# Patient Record
Sex: MALE | Race: WHITE | NOT HISPANIC OR LATINO | Employment: OTHER | URBAN - METROPOLITAN AREA
[De-identification: names, ages, dates, MRNs, and addresses within clinical notes are randomized per-mention and may not be internally consistent; named-entity substitution may affect disease eponyms.]

---

## 2019-09-24 ENCOUNTER — OFFICE VISIT (OUTPATIENT)
Dept: GASTROENTEROLOGY | Facility: CLINIC | Age: 84
End: 2019-09-24

## 2019-09-24 VITALS
TEMPERATURE: 98.1 F | SYSTOLIC BLOOD PRESSURE: 146 MMHG | WEIGHT: 193.2 LBS | BODY MASS INDEX: 27.66 KG/M2 | DIASTOLIC BLOOD PRESSURE: 74 MMHG | HEIGHT: 70 IN

## 2019-09-24 DIAGNOSIS — K62.5 RECTAL BLEEDING: ICD-10-CM

## 2019-09-24 DIAGNOSIS — R68.81 EARLY SATIETY: ICD-10-CM

## 2019-09-24 DIAGNOSIS — R19.5 CHANGE IN STOOL CALIBER: Primary | ICD-10-CM

## 2019-09-24 DIAGNOSIS — K21.9 GASTROESOPHAGEAL REFLUX DISEASE, ESOPHAGITIS PRESENCE NOT SPECIFIED: ICD-10-CM

## 2019-09-24 PROCEDURE — 99204 OFFICE O/P NEW MOD 45 MIN: CPT | Performed by: INTERNAL MEDICINE

## 2019-09-24 RX ORDER — OMEPRAZOLE 20 MG/1
20 CAPSULE, DELAYED RELEASE ORAL DAILY
COMMUNITY

## 2019-09-24 RX ORDER — SODIUM CHLORIDE, SODIUM LACTATE, POTASSIUM CHLORIDE, CALCIUM CHLORIDE 600; 310; 30; 20 MG/100ML; MG/100ML; MG/100ML; MG/100ML
30 INJECTION, SOLUTION INTRAVENOUS CONTINUOUS
Status: CANCELLED | OUTPATIENT
Start: 2019-11-12

## 2019-09-24 NOTE — PROGRESS NOTES
Chief Complaint   Patient presents with   • change in bowel habits   • early satiety   • Heartburn       Subjective     HPI    Pavan Jones is a 84 y.o. male with a past medical history noted below who presents for evaluation of GERD, change in bowel habits, change in stool caliber, early satiety.  Symptoms present for about 3 months.  He has gone from regular BMs to stools that are more narrow and feeling more incomplete evacuation.  Feels that he is staying full and uncomfortable in between BMs.  Feels bloated in his abdomen.  Having some bright red blood with harder stools.  He thinks this is related to hemorrhoids.  He denies that he is ever been constipated before.    Also feels food sitting in his stomach.  Feels full longer.  Feels bloated.      He has not had any recent colonoscopy.  He thinks he has had polyps in the past.    He does have GERD controlled with omeprazole    He stays active, works out regularly, eats healthily.  He is only on a PPI as a medication.  He denies any heart disease, no lung disease.    Mother  in her 90s.  No known GI malignancies in his family.    No smoking, no EtOH    He has had an appendectomy, he has had inguinal hernia repair.    [facility]  He reports normal recent labs.        Past Medical History:   Diagnosis Date   • GERD (gastroesophageal reflux disease)    • Sleep apnea          Current Outpatient Medications:   •  omeprazole (priLOSEC) 20 MG capsule, Take 20 mg by mouth Daily., Disp: , Rfl:     No Known Allergies    Social History     Socioeconomic History   • Marital status:      Spouse name: Not on file   • Number of children: Not on file   • Years of education: Not on file   • Highest education level: Not on file   Tobacco Use   • Smoking status: Never Smoker   • Smokeless tobacco: Never Used       History reviewed. No pertinent family history.    Review of Systems   Constitutional: Negative for activity  change, appetite change and fatigue.   HENT: Negative for sore throat and trouble swallowing.    Respiratory: Negative.    Cardiovascular: Negative.    Gastrointestinal: Positive for abdominal distention, blood in stool and constipation. Negative for abdominal pain.        Early satiety   Endocrine: Negative for cold intolerance and heat intolerance.   Genitourinary: Negative for difficulty urinating, dysuria and frequency.   Musculoskeletal: Negative for arthralgias, back pain and myalgias.   Skin: Negative.    Hematological: Negative for adenopathy. Does not bruise/bleed easily.   All other systems reviewed and are negative.      Objective     Vitals:    09/24/19 1543   BP: 146/74   Temp: 98.1 °F (36.7 °C)         09/24/19  1543   Weight: 87.6 kg (193 lb 3.2 oz)     Body mass index is 27.72 kg/m².    Physical Exam   Constitutional: He is oriented to person, place, and time. He appears well-developed and well-nourished. No distress.   Appears younger than stated age   HENT:   Head: Normocephalic and atraumatic.   Right Ear: External ear normal.   Left Ear: External ear normal.   Nose: Nose normal.   Mouth/Throat: Oropharynx is clear and moist.   Eyes: Conjunctivae and EOM are normal. Right eye exhibits no discharge. Left eye exhibits no discharge. No scleral icterus.   Neck: Normal range of motion. Neck supple. No thyromegaly present.   No supraclavicular adenopathy   Cardiovascular: Normal rate, regular rhythm, normal heart sounds and intact distal pulses. Exam reveals no gallop.   No murmur heard.  No lower extremity edema   Pulmonary/Chest: Effort normal and breath sounds normal. No respiratory distress. He has no wheezes.   Abdominal: Soft. Normal appearance and bowel sounds are normal. He exhibits no distension and no mass. There is no hepatosplenomegaly. There is no tenderness. There is no rigidity, no rebound and no guarding. No hernia.   Genitourinary:   Genitourinary Comments: Rectal exam deferred    Musculoskeletal: Normal range of motion. He exhibits no edema or tenderness.   No atrophy of upper or lower extremities.  Normal digits and nails of both hands.   Lymphadenopathy:     He has no cervical adenopathy.   Neurological: He is alert and oriented to person, place, and time. He displays no atrophy. Coordination normal.   Skin: Skin is warm and dry. No rash noted. He is not diaphoretic. No erythema.   Psychiatric: He has a normal mood and affect. His behavior is normal. Judgment and thought content normal.   Vitals reviewed.      No results found for: WBC, RBC, HGB, HCT, MCV, MCH, MCHC, RDW, RDWSD, MPV, PLT, NEUTRORELPCT, LYMPHORELPCT, MONORELPCT, EOSRELPCT, BASORELPCT, AUTOIGPER, NEUTROABS, LYMPHSABS, MONOSABS, EOSABS, BASOSABS, AUTOIGNUM, NRBC    No results found for: GLUCOSE, NA, K, CO2, CL, ANIONGAP, CREATININE, BUN, BCR, CALCIUM, EGFRIFNONA, ALKPHOS, PROTEINTOT, ALT, AST, BILITOT, ALBUMIN, GLOB, LABIL2      Imaging Results (last 7 days)     ** No results found for the last 168 hours. **            No notes on file    Assessment/Plan    Change in stool caliber: Recent event over the past few months    Rectal bleeding: Suspected anal outlet issue    Early satiety: No recent EGD    Gastroesophageal reflux disease: Controlled on daily PPI    Plan  This is a very healthy 84-year-old man, only on a PPI.  He has no history of heart disease, lung disease, stroke.  He is having a change in bowel habits, early satiety, has a history of GERD.  I do think it is reasonable in this setting to proceed with EGD and colonoscopy for further evaluation.  We discussed the procedures, the risk of sedation, the bowel prep.  He verbalizes understanding and wishes to proceed.    Continue omeprazole    Further recommendations after his endoscopy    Pavan was seen today for change in bowel habits, early satiety and heartburn.    Diagnoses and all orders for this visit:    Change in stool caliber  -     Case Request;  Standing  -     Follow Anesthesia Guidelines / Standing Orders; Future  -     Obtain Informed Consent; Future  -     Implement Anesthesia Orders Day of Procedure; Standing  -     Obtain Informed Consent; Standing  -     lactated ringers infusion  -     Case Request    Rectal bleeding  -     Case Request; Standing  -     Follow Anesthesia Guidelines / Standing Orders; Future  -     Obtain Informed Consent; Future  -     Implement Anesthesia Orders Day of Procedure; Standing  -     Obtain Informed Consent; Standing  -     lactated ringers infusion  -     Case Request    Early satiety  -     Case Request; Standing  -     Follow Anesthesia Guidelines / Standing Orders; Future  -     Obtain Informed Consent; Future  -     Implement Anesthesia Orders Day of Procedure; Standing  -     Obtain Informed Consent; Standing  -     lactated ringers infusion  -     Case Request    Gastroesophageal reflux disease, esophagitis presence not specified  -     Case Request; Standing  -     Follow Anesthesia Guidelines / Standing Orders; Future  -     Obtain Informed Consent; Future  -     Implement Anesthesia Orders Day of Procedure; Standing  -     Obtain Informed Consent; Standing  -     lactated ringers infusion  -     Case Request        I have discussed the above plan with the patient.  They verbalize understanding and are in agreement with the plan.  They have been advised to contact the office for any questions, concerns, or changes related to their health.    Dictated utilizing Dragon dictation

## 2019-09-24 NOTE — PATIENT INSTRUCTIONS
Schedule the scope tests    Continue healthy habits    For any additional questions, concerns or changes to your condition after today's office visit please contact the office at [phone].

## 2019-11-12 ENCOUNTER — ANESTHESIA EVENT (OUTPATIENT)
Dept: GASTROENTEROLOGY | Facility: HOSPITAL | Age: 84
End: 2019-11-12

## 2019-11-12 ENCOUNTER — ANESTHESIA (OUTPATIENT)
Dept: GASTROENTEROLOGY | Facility: HOSPITAL | Age: 84
End: 2019-11-12

## 2019-11-12 ENCOUNTER — HOSPITAL ENCOUNTER (OUTPATIENT)
Facility: HOSPITAL | Age: 84
Setting detail: HOSPITAL OUTPATIENT SURGERY
Discharge: HOME | End: 2019-11-12
Attending: INTERNAL MEDICINE | Admitting: INTERNAL MEDICINE

## 2019-11-12 VITALS
DIASTOLIC BLOOD PRESSURE: 69 MMHG | SYSTOLIC BLOOD PRESSURE: 124 MMHG | BODY MASS INDEX: 26.22 KG/M2 | TEMPERATURE: 97.9 F | OXYGEN SATURATION: 96 % | HEIGHT: 71 IN | HEART RATE: 71 BPM | RESPIRATION RATE: 16 BRPM | WEIGHT: 187.25 LBS

## 2019-11-12 DIAGNOSIS — R68.81 EARLY SATIETY: ICD-10-CM

## 2019-11-12 DIAGNOSIS — K21.9 GASTROESOPHAGEAL REFLUX DISEASE, ESOPHAGITIS PRESENCE NOT SPECIFIED: ICD-10-CM

## 2019-11-12 DIAGNOSIS — R19.5 CHANGE IN STOOL CALIBER: ICD-10-CM

## 2019-11-12 DIAGNOSIS — K62.5 RECTAL BLEEDING: ICD-10-CM

## 2019-11-12 PROCEDURE — 88313 SPECIAL STAINS GROUP 2: CPT | Performed by: INTERNAL MEDICINE

## 2019-11-12 PROCEDURE — 25010000002 PROPOFOL 10 MG/ML EMULSION: Performed by: ANESTHESIOLOGY

## 2019-11-12 PROCEDURE — 43239 EGD BIOPSY SINGLE/MULTIPLE: CPT | Performed by: INTERNAL MEDICINE

## 2019-11-12 PROCEDURE — S0260 H&P FOR SURGERY: HCPCS | Performed by: INTERNAL MEDICINE

## 2019-11-12 PROCEDURE — 88305 TISSUE EXAM BY PATHOLOGIST: CPT | Performed by: INTERNAL MEDICINE

## 2019-11-12 PROCEDURE — 45380 COLONOSCOPY AND BIOPSY: CPT | Performed by: INTERNAL MEDICINE

## 2019-11-12 RX ORDER — SODIUM CHLORIDE, SODIUM LACTATE, POTASSIUM CHLORIDE, CALCIUM CHLORIDE 600; 310; 30; 20 MG/100ML; MG/100ML; MG/100ML; MG/100ML
30 INJECTION, SOLUTION INTRAVENOUS CONTINUOUS
Status: DISCONTINUED | OUTPATIENT
Start: 2019-11-12 | End: 2019-11-12 | Stop reason: HOSPADM

## 2019-11-12 RX ORDER — PROPOFOL 10 MG/ML
VIAL (ML) INTRAVENOUS CONTINUOUS PRN
Status: DISCONTINUED | OUTPATIENT
Start: 2019-11-12 | End: 2019-11-12 | Stop reason: SURG

## 2019-11-12 RX ORDER — SODIUM CHLORIDE 0.9 % (FLUSH) 0.9 %
3 SYRINGE (ML) INJECTION EVERY 12 HOURS SCHEDULED
Status: DISCONTINUED | OUTPATIENT
Start: 2019-11-12 | End: 2019-11-12 | Stop reason: HOSPADM

## 2019-11-12 RX ORDER — SODIUM CHLORIDE 0.9 % (FLUSH) 0.9 %
10 SYRINGE (ML) INJECTION AS NEEDED
Status: DISCONTINUED | OUTPATIENT
Start: 2019-11-12 | End: 2019-11-12 | Stop reason: HOSPADM

## 2019-11-12 RX ORDER — GLYCOPYRROLATE 0.2 MG/ML
INJECTION INTRAMUSCULAR; INTRAVENOUS AS NEEDED
Status: DISCONTINUED | OUTPATIENT
Start: 2019-11-12 | End: 2019-11-12 | Stop reason: SURG

## 2019-11-12 RX ORDER — LIDOCAINE HYDROCHLORIDE 20 MG/ML
INJECTION, SOLUTION INFILTRATION; PERINEURAL AS NEEDED
Status: DISCONTINUED | OUTPATIENT
Start: 2019-11-12 | End: 2019-11-12 | Stop reason: SURG

## 2019-11-12 RX ADMIN — SODIUM CHLORIDE, POTASSIUM CHLORIDE, SODIUM LACTATE AND CALCIUM CHLORIDE 30 ML/HR: 600; 310; 30; 20 INJECTION, SOLUTION INTRAVENOUS at 09:45

## 2019-11-12 RX ADMIN — PROPOFOL 140 MCG/KG/MIN: 10 INJECTION, EMULSION INTRAVENOUS at 10:00

## 2019-11-12 RX ADMIN — LIDOCAINE HYDROCHLORIDE 4 MG: 20 INJECTION, SOLUTION INFILTRATION; PERINEURAL at 10:00

## 2019-11-12 RX ADMIN — GLYCOPYRROLATE 0.2 MCG: 0.2 INJECTION INTRAMUSCULAR; INTRAVENOUS at 10:00

## 2019-11-12 RX ADMIN — ALFENTANIL HYDROCHLORIDE 250 MCG: 500 INJECTION, SOLUTION INTRAVENOUS at 10:15

## 2019-11-12 RX ADMIN — ALFENTANIL HYDROCHLORIDE 250 MCG: 500 INJECTION, SOLUTION INTRAVENOUS at 10:02

## 2019-11-12 NOTE — H&P
[facility]  Pre Procedure History & Physical    Chief Complaint: GERD, early satiety, change in bowel habits, change in stool caliber      HPI: 84 y.o. male with a past medical history noted below who presents for evaluation of GERD, change in bowel habits, change in stool caliber, early satiety.  Symptoms present for about 3 months.  He has gone from regular BMs to stools that are more narrow and feeling more incomplete evacuation.  Feels that he is staying full and uncomfortable in between BMs.  Feels bloated in his abdomen.  Having some bright red blood with harder stools.  He thinks this is related to hemorrhoids.  He denies that he is ever been constipated before.     Also feels food sitting in his stomach.  Feels full longer.  Feels bloated.       He has not had any recent colonoscopy.  He thinks he has had polyps in the past.     He does have GERD controlled with omeprazole     He stays active, works out regularly, eats healthily.  He is only on a PPI as a medication.  He denies any heart disease, no lung disease.     Mother  in her 90s.  No known GI malignancies in his family.     No smoking, no EtOH     He has had an appendectomy, he has had inguinal hernia repair.     [facility].  He reports normal recent labs.    Past Medical History:   Past Medical History:   Diagnosis Date   • GERD (gastroesophageal reflux disease)    • Sleep apnea        Family History:  No family history on file.    Social History:   reports that he has never smoked. He has never used smokeless tobacco.    Medications:   No medications prior to admission.       Allergies:  Patient has no known allergies.    ROS:    Pertinent items are noted in HPI     Objective     There were no vitals taken for this visit.    Physical Exam   Constitutional: Pt is oriented to person, place, and time and well-developed, well-nourished, and in no distress.   HENT:   Mouth/Throat:  Oropharynx is clear and moist.   Neck: Normal range of motion. Neck supple.   Cardiovascular: Normal rate, regular rhythm and normal heart sounds.    Pulmonary/Chest: Effort normal and breath sounds normal. No respiratory distress. No  wheezes.   Abdominal: Soft. Bowel sounds are normal.   Skin: Skin is warm and dry.   Psychiatric: Mood, memory, affect and judgment normal.     Assessment/Plan     Diagnosis: GERD, early satiety, change in bowel habits, change in stool caliber      Anticipated Surgical Procedure:  EGD  Colonoscopy    The risks, benefits, and alternatives of this procedure have been discussed with the patient or the responsible party- the patient understands and agrees to proceed.

## 2019-11-12 NOTE — ANESTHESIA PREPROCEDURE EVALUATION
Anesthesia Evaluation     Patient summary reviewed and Nursing notes reviewed   no history of anesthetic complications:  NPO Solid Status: > 6 hours  NPO Liquid Status: > 6 hours           Airway   Mallampati: II  TM distance: >3 FB  Neck ROM: full  no difficulty expected and No difficulty expected  Dental - normal exam     Pulmonary - normal exam    breath sounds clear to auscultation  (+) sleep apnea,   (-) rhonchi, decreased breath sounds, wheezes, rales, stridor  Cardiovascular - negative cardio ROS and normal exam    NYHA Classification: I  Rhythm: regular  Rate: normal    (-) murmur, weak pulses, friction rub, systolic click, carotid bruits, JVD, peripheral edema      Neuro/Psych- negative ROS  GI/Hepatic/Renal/Endo    (+)  GERD, GI bleeding lower active bleeding,     Musculoskeletal (-) negative ROS    Abdominal  - normal exam    Abdomen: soft.   Substance History - negative use     OB/GYN negative ob/gyn ROS         Other - negative ROS                       Anesthesia Plan    ASA 2     MAC     intravenous induction     Anesthetic plan, all risks, benefits, and alternatives have been provided, discussed and informed consent has been obtained with: patient.

## 2019-11-12 NOTE — ANESTHESIA POSTPROCEDURE EVALUATION
"Patient: Pavan Jnoes    Procedure Summary     Date:  11/12/19 Room / Location:  [facility] ENDOSCOPY 8 /  [facility]    Anesthesia Start:  0955 Anesthesia Stop:  1034    Procedures:       COLONOSCOPY into cecum and TI with biopsies and cold bx polypectomies  (N/A )      ESOPHAGOGASTRODUODENOSCOPY with biopsies (N/A Esophagus) Diagnosis:       Change in stool caliber      Rectal bleeding      Early satiety      Gastroesophageal reflux disease, esophagitis presence not specified      (Change in stool caliber [R19.4])      (Rectal bleeding [K62.5])      (Early satiety [R68.81])      (Gastroesophageal reflux disease, esophagitis presence not specified [K21.9])    Surgeon:  Norah Contreras MD Provider:  Austen Walters MD    Anesthesia Type:  MAC ASA Status:  2          Anesthesia Type: MAC  Last vitals  BP   124/69 (11/12/19 1055)   Temp   36.6 °C (97.9 °F) (11/12/19 0923)   Pulse   71 (11/12/19 1055)   Resp   16 (11/12/19 1055)     SpO2   96 % (11/12/19 1055)     Post Anesthesia Care and Evaluation    Patient location during evaluation: bedside  Patient participation: complete - patient participated  Level of consciousness: awake and alert  Pain management: adequate  Airway patency: patent  Anesthetic complications: No anesthetic complications    Cardiovascular status: acceptable  Respiratory status: acceptable  Hydration status: acceptable    Comments: /69 (BP Location: Left arm, Patient Position: Sitting)   Pulse 71   Temp 36.6 °C (97.9 °F) (Oral)   Resp 16   Ht 180.3 cm (71\")   Wt 84.9 kg (187 lb 4 oz)   SpO2 96%   BMI 26.12 kg/m²           "

## 2019-11-15 LAB
CYTO UR: NORMAL
LAB AP CASE REPORT: NORMAL
LAB AP DIAGNOSIS COMMENT: NORMAL
PATH REPORT.ADDENDUM SPEC: NORMAL
PATH REPORT.FINAL DX SPEC: NORMAL
PATH REPORT.GROSS SPEC: NORMAL

## 2019-11-19 NOTE — PROGRESS NOTES
The colon polyps were benign hyperplastic polyps.    The random colon biopsies show normal healthy colon tissue.    He does have some mild gastritis and mild esophagitis but no evidence of Sharma's esophagus nor H. pylori infection    Continue omeprazole    No recall needed    He is welcome to follow-up with me as needed

## 2019-11-21 ENCOUNTER — TELEPHONE (OUTPATIENT)
Dept: GASTROENTEROLOGY | Facility: CLINIC | Age: 84
End: 2019-11-21

## 2019-11-21 NOTE — TELEPHONE ENCOUNTER
----- Message from Shila Norris sent at 11/21/2019  2:07 PM EST -----  Regarding: C/S  Contact: [phone]  CALLING FOR C/S REPORT

## 2019-11-21 NOTE — TELEPHONE ENCOUNTER
"----- Message from Nroah Contreras MD sent at 11/19/2019  6:04 PM EST -----  The colon polyps were benign hyperplastic polyps.    The random colon biopsies show normal healthy colon tissue.    He does have some mild gastritis and mild esophagitis but no evidence of Sharma's esophagus nor H. pylori infection    Continue omeprazole    No recall needed    He is welcome to follow-up with me as needed      __Called pt and advised per Dr Contreras of the above.  Pt verb understanding.  Pt is asking if he can donate blood.  Advised pt will send message to Dr Contreras. Pt verb understanding and states he thinks it would be to donate blood and \"get rid of some of the stale blood and replace it with new rejuevenated blood\".    -Alexus Magana RN  "

## 2019-11-21 NOTE — TELEPHONE ENCOUNTER
I do not think donating blood will have the intended effect that he wants.  He is welcome to do so if he wishes, there is no contraindication from my standpoint.

## 2019-11-26 NOTE — TELEPHONE ENCOUNTER
His digital rectal exam was normal with no enlarged prostate palpated.  However, if he is concerned about this he should follow-up with his primary care physician.

## 2019-11-26 NOTE — TELEPHONE ENCOUNTER
Called pt and advised per Dr Contreras that she does not think donating blood will have the intended effect that he wants. He is welcome to do so if he wishes, there is no contraindication from her standpoint.     Pt verb understanding and reports that Dr Contreras was going to check his prostate and he is asking about this. ADvised will send message to Dr Contreras.

## 2019-11-26 NOTE — TELEPHONE ENCOUNTER
Called pt and advised per Dr Contreras that his digital exam was normal with no enlarged prostate palpated.  However , if he is concerned about this he should f/u with his pcp. Pt verb understanding.

## (undated) DEVICE — BITEBLOCK OMNI BLOC

## (undated) DEVICE — SENSR O2 OXIMAX FNGR A/ 18IN NONSTR

## (undated) DEVICE — CANN NASL CO2 TRULINK W/O2 A/

## (undated) DEVICE — THE TORRENT IRRIGATION SCOPE CONNECTOR IS USED WITH THE TORRENT IRRIGATION TUBING TO PROVIDE IRRIGATION FLUIDS SUCH AS STERILE WATER DURING GASTROINTESTINAL ENDOSCOPIC PROCEDURES WHEN USED IN CONJUNCTION WITH AN IRRIGATION PUMP (OR ELECTROSURGICAL UNIT).: Brand: TORRENT

## (undated) DEVICE — TUBING, SUCTION, 1/4" X 10', STRAIGHT: Brand: MEDLINE

## (undated) DEVICE — KT VLV BIOGUARD SXN BIOP AIR/H20 CONN 4PC DISP

## (undated) DEVICE — SINGLE-USE BIOPSY FORCEPS: Brand: RADIAL JAW 4